# Patient Record
Sex: FEMALE | Race: WHITE | NOT HISPANIC OR LATINO | URBAN - METROPOLITAN AREA
[De-identification: names, ages, dates, MRNs, and addresses within clinical notes are randomized per-mention and may not be internally consistent; named-entity substitution may affect disease eponyms.]

---

## 2024-04-10 ENCOUNTER — LAB REQUISITION (OUTPATIENT)
Dept: LAB | Facility: HOSPITAL | Age: 34
End: 2024-04-10
Payer: COMMERCIAL

## 2024-04-10 ENCOUNTER — LAB (OUTPATIENT)
Dept: LAB | Facility: LAB | Age: 34
End: 2024-04-10

## 2024-04-10 DIAGNOSIS — Z34.90 ENCOUNTER FOR SUPERVISION OF NORMAL PREGNANCY, UNSPECIFIED, UNSPECIFIED TRIMESTER (HHS-HCC): ICD-10-CM

## 2024-04-10 DIAGNOSIS — Z34.90 ENCOUNTER FOR SUPERVISION OF NORMAL PREGNANCY, UNSPECIFIED, UNSPECIFIED TRIMESTER (HHS-HCC): Primary | ICD-10-CM

## 2024-04-10 LAB
ABO GROUP (TYPE) IN BLOOD: NORMAL
ANTIBODY SCREEN: NORMAL
ERYTHROCYTE [DISTWIDTH] IN BLOOD BY AUTOMATED COUNT: 13.7 % (ref 11.5–14.5)
HCT VFR BLD AUTO: 40 % (ref 36–46)
HGB BLD-MCNC: 13.3 G/DL (ref 12–16)
HIV 1+2 AB+HIV1 P24 AG SERPL QL IA: NONREACTIVE
MCH RBC QN AUTO: 29.4 PG (ref 26–34)
MCHC RBC AUTO-ENTMCNC: 33.3 G/DL (ref 32–36)
MCV RBC AUTO: 88 FL (ref 80–100)
NRBC BLD-RTO: 0 /100 WBCS (ref 0–0)
PLATELET # BLD AUTO: 207 X10*3/UL (ref 150–450)
RBC # BLD AUTO: 4.53 X10*6/UL (ref 4–5.2)
REFLEX ADDED, ANEMIA PANEL: NORMAL
RH FACTOR (ANTIGEN D): NORMAL
RUBV IGG SERPL IA-ACNC: 1 IA
RUBV IGG SERPL QL IA: POSITIVE
TREPONEMA PALLIDUM IGG+IGM AB [PRESENCE] IN SERUM OR PLASMA BY IMMUNOASSAY: NONREACTIVE
WBC # BLD AUTO: 6.8 X10*3/UL (ref 4.4–11.3)

## 2024-04-10 PROCEDURE — 86900 BLOOD TYPING SEROLOGIC ABO: CPT

## 2024-04-10 PROCEDURE — 86901 BLOOD TYPING SEROLOGIC RH(D): CPT

## 2024-04-10 PROCEDURE — 87800 DETECT AGNT MULT DNA DIREC: CPT

## 2024-04-10 PROCEDURE — 87340 HEPATITIS B SURFACE AG IA: CPT

## 2024-04-10 PROCEDURE — 86850 RBC ANTIBODY SCREEN: CPT

## 2024-04-10 PROCEDURE — 87389 HIV-1 AG W/HIV-1&-2 AB AG IA: CPT

## 2024-04-10 PROCEDURE — 86780 TREPONEMA PALLIDUM: CPT

## 2024-04-10 PROCEDURE — 36415 COLL VENOUS BLD VENIPUNCTURE: CPT

## 2024-04-10 PROCEDURE — 86803 HEPATITIS C AB TEST: CPT

## 2024-04-10 PROCEDURE — 85027 COMPLETE CBC AUTOMATED: CPT

## 2024-04-10 PROCEDURE — 86317 IMMUNOASSAY INFECTIOUS AGENT: CPT

## 2024-04-10 PROCEDURE — 87086 URINE CULTURE/COLONY COUNT: CPT

## 2024-04-11 LAB
BACTERIA UR CULT: NORMAL
C TRACH RRNA SPEC QL NAA+PROBE: NEGATIVE
HBV SURFACE AG SERPL QL IA: NONREACTIVE
HCV AB SER QL: NONREACTIVE
N GONORRHOEA DNA SPEC QL PROBE+SIG AMP: NEGATIVE

## 2024-06-19 ENCOUNTER — LAB (OUTPATIENT)
Dept: LAB | Facility: LAB | Age: 34
End: 2024-06-19
Payer: COMMERCIAL

## 2024-06-19 DIAGNOSIS — Z34.90 ENCOUNTER FOR SUPERVISION OF NORMAL PREGNANCY, UNSPECIFIED, UNSPECIFIED TRIMESTER (HHS-HCC): Primary | ICD-10-CM

## 2024-06-19 LAB
ABO GROUP (TYPE) IN BLOOD: NORMAL
ANTIBODY SCREEN: NORMAL
ERYTHROCYTE [DISTWIDTH] IN BLOOD BY AUTOMATED COUNT: 13.2 % (ref 11.5–14.5)
GLUCOSE 1H P 50 G GLC PO SERPL-MCNC: 131 MG/DL
HCT VFR BLD AUTO: 33.5 % (ref 36–46)
HGB BLD-MCNC: 11 G/DL (ref 12–16)
MCH RBC QN AUTO: 28.9 PG (ref 26–34)
MCHC RBC AUTO-ENTMCNC: 32.8 G/DL (ref 32–36)
MCV RBC AUTO: 88 FL (ref 80–100)
NRBC BLD-RTO: 0 /100 WBCS (ref 0–0)
PLATELET # BLD AUTO: 188 X10*3/UL (ref 150–450)
RBC # BLD AUTO: 3.8 X10*6/UL (ref 4–5.2)
REFLEX ADDED, ANEMIA PANEL: NORMAL
RH FACTOR (ANTIGEN D): NORMAL
WBC # BLD AUTO: 6.2 X10*3/UL (ref 4.4–11.3)

## 2024-06-19 PROCEDURE — 86850 RBC ANTIBODY SCREEN: CPT

## 2024-06-19 PROCEDURE — 82947 ASSAY GLUCOSE BLOOD QUANT: CPT

## 2024-06-19 PROCEDURE — 85027 COMPLETE CBC AUTOMATED: CPT

## 2024-06-19 PROCEDURE — 86900 BLOOD TYPING SEROLOGIC ABO: CPT

## 2024-06-19 PROCEDURE — 86901 BLOOD TYPING SEROLOGIC RH(D): CPT

## 2024-06-19 PROCEDURE — 36415 COLL VENOUS BLD VENIPUNCTURE: CPT

## 2024-08-15 ENCOUNTER — LAB REQUISITION (OUTPATIENT)
Dept: LAB | Facility: HOSPITAL | Age: 34
End: 2024-08-15
Payer: COMMERCIAL

## 2024-08-15 DIAGNOSIS — Z34.90 ENCOUNTER FOR SUPERVISION OF NORMAL PREGNANCY, UNSPECIFIED, UNSPECIFIED TRIMESTER (HHS-HCC): ICD-10-CM

## 2024-08-15 PROCEDURE — 87081 CULTURE SCREEN ONLY: CPT

## 2024-08-18 LAB — GP B STREP GENITAL QL CULT: NORMAL

## 2024-09-07 ENCOUNTER — HOSPITAL ENCOUNTER (OUTPATIENT)
Facility: HOSPITAL | Age: 34
Discharge: HOME | End: 2024-09-07
Attending: OBSTETRICS & GYNECOLOGY | Admitting: OBSTETRICS & GYNECOLOGY
Payer: COMMERCIAL

## 2024-09-07 VITALS
BODY MASS INDEX: 26.99 KG/M2 | DIASTOLIC BLOOD PRESSURE: 76 MMHG | WEIGHT: 152.34 LBS | TEMPERATURE: 98.1 F | HEART RATE: 103 BPM | HEIGHT: 63 IN | OXYGEN SATURATION: 99 % | SYSTOLIC BLOOD PRESSURE: 118 MMHG | RESPIRATION RATE: 18 BRPM

## 2024-09-07 PROCEDURE — 99215 OFFICE O/P EST HI 40 MIN: CPT | Mod: 25

## 2024-09-07 PROCEDURE — 59025 FETAL NON-STRESS TEST: CPT | Performed by: OBSTETRICS & GYNECOLOGY

## 2024-09-07 PROCEDURE — 99212 OFFICE O/P EST SF 10 MIN: CPT | Performed by: OBSTETRICS & GYNECOLOGY

## 2024-09-07 RX ORDER — SERTRALINE HYDROCHLORIDE 50 MG/1
50 TABLET, FILM COATED ORAL DAILY
COMMUNITY

## 2024-09-07 ASSESSMENT — PAIN SCALES - GENERAL: PAINLEVEL_OUTOF10: 3

## 2024-09-07 NOTE — DISCHARGE SUMMARY
Discharge Summary    Admission Date: 9/7/2024  Discharge Date: 9/7    Discharge Diagnosis  False   labor   , decr   fetal activity     Hospital Course  Delivery Date: This patient has no babies on file. This patient has no babies on file.  Delivery type: This patient has no babies on file.   GA at delivery: 39w3d  Outcome: This patient has no babies on file.  Anesthesia during delivery: This patient has no babies on file.  Intrapartum complications: This patient has no babies on file.  Feeding method:       Procedures:  nst  Contraception at discharge: none      Pertinent Physical Exam At Time of Discharge    Cx   1-2/long    Last Vitals:  Temp Pulse Resp BP MAP Pulse Ox   36.7 °C (98.1 °F) 103 18 118/76 92 99 %     Discharge Meds     Your medication list        CONTINUE taking these medications        Instructions Last Dose Given Next Dose Due   PRENATAL VITAMIN ORAL           sertraline 50 mg tablet  Commonly known as: Zoloft                     Complications Requiring Follow-Up  Iol tues  rv  if   reg   ctxs     or    rom       Test Results Pending At Discharge  Pending Labs       No current pending labs.            Outpatient Follow-Up  Future Appointments   Date Time Provider Department Center   9/10/2024  6:00 AM [facility] L&D PROCEDURE ROOM 1 [address] spent 15 minutes in the professional and overall care of this patient.      Javon Galvan MD

## 2024-09-07 NOTE — H&P
Note Type:  OB Triage H&P    ASSESSMENT & PLAN: Charlee Villegas is a 34 y.o.  at 39w3d. MARGOT: 2024, by Ultrasound. Decr  FA   and   ?  Ctxs  aller    zithromax   meds   zoloft  pmh   depression , anxiety  psh  neg smoke  etoh  drugs   neg    gbs   neg      Diagnosis: false labor  , reactive   nst      Plan  -Admit to L&D, consented  - corticosteroids: not indicated  -Magnesium for fetal neuroprotection: not indicated  -GBS prophylaxis: not indicated    Fetal Status  -NST reactive, reassuring     -EFW 3600 gms by exam      -GBS neg  -NICU consult no    -Postpartum Contraception Plan:     Pregnancy Problems (from 24 to present)       No problems associated with this episode.            Subjective   Decreased fetal movement.      Prenatal Provider andr    OB History    Para Term  AB Living   8 5 5 0 2 5   SAB IAB Ectopic Multiple Live Births   2 0 0 0 5      # Outcome Date GA Lbr Marlon/2nd Weight Sex Type Anes PTL Lv   8 Current            7 Term 22    F Vag-Spont   RASHAUN   6 Term 20 39w0d   M Vag-Spont None  RASHAUN   5 2019     Complete      4 Term 18 38w0d   M Vag-Spont None  RASHAUN   3 SAB  7w0d    Complete      2 Term 09/15/15 38w0d   M Vag-Spont None  RASHAUN   1 Term 14 38w0d   M Vag-Spont EPI  RASHAUN       No past surgical history on file.    Social History     Tobacco Use    Smoking status: Never    Smokeless tobacco: Never   Substance Use Topics    Alcohol use: Never       Allergies   Allergen Reactions    Azithromycin Diarrhea       Medications Prior to Admission   Medication Sig Dispense Refill Last Dose    prenatal vit no.124/iron/folic (PRENATAL VITAMIN ORAL) Take 1 tablet by mouth once daily.   2024    sertraline (Zoloft) 50 mg tablet Take 1 tablet (50 mg) by mouth once daily.   Past Week     Objective     Last Vitals  Temp Pulse Resp BP MAP O2 Sat   36.7 °C (98.1 °F) 103 18 118/76 92 99 %     Blood Pressures         2024  1440              BP: 118/76             Physical Exam  General: NAD, mood appropriate  Cardiopulmonary: warm and well perfused, breathing comfortably on room air  Abdomen: Gravid, non-tender  Extremities: full range of motion   Cervix: 2 /long       Fetal Monitoring  Baseline: nl bpm, Variability: moderate,  Accelerations: present and Decelerations: none    Uterine Activity: No contractions seen on toco    Interpretation: Reactive    Bedside ultrasound: No

## 2024-09-10 ENCOUNTER — HOSPITAL ENCOUNTER (INPATIENT)
Facility: HOSPITAL | Age: 34
LOS: 1 days | Discharge: HOME | End: 2024-09-11
Attending: OBSTETRICS & GYNECOLOGY | Admitting: OBSTETRICS & GYNECOLOGY
Payer: COMMERCIAL

## 2024-09-10 ENCOUNTER — APPOINTMENT (OUTPATIENT)
Dept: OBSTETRICS AND GYNECOLOGY | Facility: HOSPITAL | Age: 34
End: 2024-09-10
Payer: COMMERCIAL

## 2024-09-10 PROBLEM — I42.9 FAMILIAL CARDIOMYOPATHY (MULTI): Status: ACTIVE | Noted: 2024-09-10

## 2024-09-10 PROBLEM — L23.7 CONTACT DERMATITIS DUE TO POISON IVY: Status: RESOLVED | Noted: 2024-09-10 | Resolved: 2024-09-10

## 2024-09-10 PROBLEM — Z34.90 TERM PREGNANCY (HHS-HCC): Status: RESOLVED | Noted: 2024-09-10 | Resolved: 2024-09-10

## 2024-09-10 PROBLEM — Z34.90 TERM PREGNANCY (HHS-HCC): Status: ACTIVE | Noted: 2024-09-10

## 2024-09-10 LAB
ABO GROUP (TYPE) IN BLOOD: NORMAL
ANTIBODY SCREEN: NORMAL
BB ANTIBODY IDENTIFICATION: NORMAL
CASE #: NORMAL
ERYTHROCYTE [DISTWIDTH] IN BLOOD BY AUTOMATED COUNT: 15.6 % (ref 11.5–14.5)
HCT VFR BLD AUTO: 36.2 % (ref 36–46)
HGB BLD-MCNC: 12 G/DL (ref 12–16)
MCH RBC QN AUTO: 28.2 PG (ref 26–34)
MCHC RBC AUTO-ENTMCNC: 33.1 G/DL (ref 32–36)
MCV RBC AUTO: 85 FL (ref 80–100)
NRBC BLD-RTO: 0 /100 WBCS (ref 0–0)
PLATELET # BLD AUTO: 157 X10*3/UL (ref 150–450)
RBC # BLD AUTO: 4.26 X10*6/UL (ref 4–5.2)
RH FACTOR (ANTIGEN D): NORMAL
TREPONEMA PALLIDUM IGG+IGM AB [PRESENCE] IN SERUM OR PLASMA BY IMMUNOASSAY: NONREACTIVE
WBC # BLD AUTO: 6.4 X10*3/UL (ref 4.4–11.3)

## 2024-09-10 PROCEDURE — 59409 OBSTETRICAL CARE: CPT | Performed by: OBSTETRICS & GYNECOLOGY

## 2024-09-10 PROCEDURE — 86780 TREPONEMA PALLIDUM: CPT | Mod: GEALAB | Performed by: OBSTETRICS & GYNECOLOGY

## 2024-09-10 PROCEDURE — 86870 RBC ANTIBODY IDENTIFICATION: CPT

## 2024-09-10 PROCEDURE — 7210000002 HC LABOR PER HOUR

## 2024-09-10 PROCEDURE — 85027 COMPLETE CBC AUTOMATED: CPT | Performed by: OBSTETRICS & GYNECOLOGY

## 2024-09-10 PROCEDURE — 10907ZC DRAINAGE OF AMNIOTIC FLUID, THERAPEUTIC FROM PRODUCTS OF CONCEPTION, VIA NATURAL OR ARTIFICIAL OPENING: ICD-10-PCS | Performed by: OBSTETRICS & GYNECOLOGY

## 2024-09-10 PROCEDURE — 59050 FETAL MONITOR W/REPORT: CPT

## 2024-09-10 PROCEDURE — 36415 COLL VENOUS BLD VENIPUNCTURE: CPT | Performed by: OBSTETRICS & GYNECOLOGY

## 2024-09-10 PROCEDURE — 3E0P7VZ INTRODUCTION OF HORMONE INTO FEMALE REPRODUCTIVE, VIA NATURAL OR ARTIFICIAL OPENING: ICD-10-PCS | Performed by: OBSTETRICS & GYNECOLOGY

## 2024-09-10 PROCEDURE — 3E033VJ INTRODUCTION OF OTHER HORMONE INTO PERIPHERAL VEIN, PERCUTANEOUS APPROACH: ICD-10-PCS | Performed by: OBSTETRICS & GYNECOLOGY

## 2024-09-10 PROCEDURE — 7100000016 HC LABOR RECOVERY PER HOUR

## 2024-09-10 PROCEDURE — 86901 BLOOD TYPING SEROLOGIC RH(D): CPT | Performed by: OBSTETRICS & GYNECOLOGY

## 2024-09-10 PROCEDURE — 2500000001 HC RX 250 WO HCPCS SELF ADMINISTERED DRUGS (ALT 637 FOR MEDICARE OP): Performed by: OBSTETRICS & GYNECOLOGY

## 2024-09-10 PROCEDURE — 2500000004 HC RX 250 GENERAL PHARMACY W/ HCPCS (ALT 636 FOR OP/ED): Performed by: OBSTETRICS & GYNECOLOGY

## 2024-09-10 PROCEDURE — 1120000001 HC OB PRIVATE ROOM DAILY

## 2024-09-10 RX ORDER — LOPERAMIDE HYDROCHLORIDE 2 MG/1
4 CAPSULE ORAL EVERY 2 HOUR PRN
Status: DISCONTINUED | OUTPATIENT
Start: 2024-09-10 | End: 2024-09-11 | Stop reason: HOSPADM

## 2024-09-10 RX ORDER — OXYTOCIN/0.9 % SODIUM CHLORIDE 30/500 ML
2-30 PLASTIC BAG, INJECTION (ML) INTRAVENOUS CONTINUOUS
Status: DISCONTINUED | OUTPATIENT
Start: 2024-09-10 | End: 2024-09-10

## 2024-09-10 RX ORDER — CARBOPROST TROMETHAMINE 250 UG/ML
250 INJECTION, SOLUTION INTRAMUSCULAR ONCE AS NEEDED
Status: DISCONTINUED | OUTPATIENT
Start: 2024-09-10 | End: 2024-09-11 | Stop reason: HOSPADM

## 2024-09-10 RX ORDER — NIFEDIPINE 10 MG/1
10 CAPSULE ORAL ONCE AS NEEDED
Status: DISCONTINUED | OUTPATIENT
Start: 2024-09-10 | End: 2024-09-10

## 2024-09-10 RX ORDER — ONDANSETRON 4 MG/1
4 TABLET, FILM COATED ORAL EVERY 6 HOURS PRN
Status: DISCONTINUED | OUTPATIENT
Start: 2024-09-10 | End: 2024-09-11 | Stop reason: HOSPADM

## 2024-09-10 RX ORDER — HYDRALAZINE HYDROCHLORIDE 20 MG/ML
5 INJECTION INTRAMUSCULAR; INTRAVENOUS ONCE AS NEEDED
Status: DISCONTINUED | OUTPATIENT
Start: 2024-09-10 | End: 2024-09-11 | Stop reason: HOSPADM

## 2024-09-10 RX ORDER — HYDRALAZINE HYDROCHLORIDE 20 MG/ML
5 INJECTION INTRAMUSCULAR; INTRAVENOUS ONCE AS NEEDED
Status: DISCONTINUED | OUTPATIENT
Start: 2024-09-10 | End: 2024-09-10

## 2024-09-10 RX ORDER — METOCLOPRAMIDE 10 MG/1
10 TABLET ORAL EVERY 6 HOURS PRN
Status: DISCONTINUED | OUTPATIENT
Start: 2024-09-10 | End: 2024-09-10

## 2024-09-10 RX ORDER — MISOPROSTOL 200 UG/1
800 TABLET ORAL ONCE AS NEEDED
Status: DISCONTINUED | OUTPATIENT
Start: 2024-09-10 | End: 2024-09-11 | Stop reason: HOSPADM

## 2024-09-10 RX ORDER — TRANEXAMIC ACID 100 MG/ML
1000 INJECTION, SOLUTION INTRAVENOUS ONCE AS NEEDED
Status: DISCONTINUED | OUTPATIENT
Start: 2024-09-10 | End: 2024-09-11 | Stop reason: HOSPADM

## 2024-09-10 RX ORDER — OXYTOCIN 10 [USP'U]/ML
10 INJECTION, SOLUTION INTRAMUSCULAR; INTRAVENOUS ONCE AS NEEDED
Status: DISCONTINUED | OUTPATIENT
Start: 2024-09-10 | End: 2024-09-10

## 2024-09-10 RX ORDER — METHYLERGONOVINE MALEATE 0.2 MG/ML
0.2 INJECTION INTRAVENOUS ONCE AS NEEDED
Status: DISCONTINUED | OUTPATIENT
Start: 2024-09-10 | End: 2024-09-10

## 2024-09-10 RX ORDER — METHYLERGONOVINE MALEATE 0.2 MG/ML
0.2 INJECTION INTRAVENOUS ONCE AS NEEDED
Status: DISCONTINUED | OUTPATIENT
Start: 2024-09-10 | End: 2024-09-11 | Stop reason: HOSPADM

## 2024-09-10 RX ORDER — CARBOPROST TROMETHAMINE 250 UG/ML
250 INJECTION, SOLUTION INTRAMUSCULAR ONCE AS NEEDED
Status: DISCONTINUED | OUTPATIENT
Start: 2024-09-10 | End: 2024-09-10

## 2024-09-10 RX ORDER — LOPERAMIDE HYDROCHLORIDE 2 MG/1
4 CAPSULE ORAL EVERY 2 HOUR PRN
Status: DISCONTINUED | OUTPATIENT
Start: 2024-09-10 | End: 2024-09-10

## 2024-09-10 RX ORDER — LIDOCAINE HYDROCHLORIDE 10 MG/ML
30 INJECTION, SOLUTION INFILTRATION; PERINEURAL ONCE AS NEEDED
Status: DISCONTINUED | OUTPATIENT
Start: 2024-09-10 | End: 2024-09-10

## 2024-09-10 RX ORDER — ONDANSETRON HYDROCHLORIDE 2 MG/ML
4 INJECTION, SOLUTION INTRAVENOUS EVERY 6 HOURS PRN
Status: DISCONTINUED | OUTPATIENT
Start: 2024-09-10 | End: 2024-09-11 | Stop reason: HOSPADM

## 2024-09-10 RX ORDER — OXYTOCIN 10 [USP'U]/ML
10 INJECTION, SOLUTION INTRAMUSCULAR; INTRAVENOUS ONCE AS NEEDED
Status: DISCONTINUED | OUTPATIENT
Start: 2024-09-10 | End: 2024-09-11 | Stop reason: HOSPADM

## 2024-09-10 RX ORDER — OXYTOCIN/0.9 % SODIUM CHLORIDE 30/500 ML
60 PLASTIC BAG, INJECTION (ML) INTRAVENOUS ONCE AS NEEDED
Status: DISCONTINUED | OUTPATIENT
Start: 2024-09-10 | End: 2024-09-11 | Stop reason: HOSPADM

## 2024-09-10 RX ORDER — SODIUM CHLORIDE, SODIUM LACTATE, POTASSIUM CHLORIDE, CALCIUM CHLORIDE 600; 310; 30; 20 MG/100ML; MG/100ML; MG/100ML; MG/100ML
125 INJECTION, SOLUTION INTRAVENOUS CONTINUOUS
Status: DISCONTINUED | OUTPATIENT
Start: 2024-09-10 | End: 2024-09-10

## 2024-09-10 RX ORDER — ADHESIVE BANDAGE
10 BANDAGE TOPICAL
Status: DISCONTINUED | OUTPATIENT
Start: 2024-09-10 | End: 2024-09-11 | Stop reason: HOSPADM

## 2024-09-10 RX ORDER — NIFEDIPINE 10 MG/1
10 CAPSULE ORAL ONCE AS NEEDED
Status: DISCONTINUED | OUTPATIENT
Start: 2024-09-10 | End: 2024-09-11 | Stop reason: HOSPADM

## 2024-09-10 RX ORDER — BISACODYL 10 MG/1
10 SUPPOSITORY RECTAL DAILY PRN
Status: DISCONTINUED | OUTPATIENT
Start: 2024-09-10 | End: 2024-09-11 | Stop reason: HOSPADM

## 2024-09-10 RX ORDER — ONDANSETRON HYDROCHLORIDE 2 MG/ML
4 INJECTION, SOLUTION INTRAVENOUS EVERY 6 HOURS PRN
Status: DISCONTINUED | OUTPATIENT
Start: 2024-09-10 | End: 2024-09-10

## 2024-09-10 RX ORDER — LABETALOL HYDROCHLORIDE 5 MG/ML
20 INJECTION, SOLUTION INTRAVENOUS ONCE AS NEEDED
Status: DISCONTINUED | OUTPATIENT
Start: 2024-09-10 | End: 2024-09-11 | Stop reason: HOSPADM

## 2024-09-10 RX ORDER — DIPHENHYDRAMINE HCL 25 MG
25 CAPSULE ORAL EVERY 6 HOURS PRN
Status: DISCONTINUED | OUTPATIENT
Start: 2024-09-10 | End: 2024-09-11 | Stop reason: HOSPADM

## 2024-09-10 RX ORDER — DIPHENHYDRAMINE HYDROCHLORIDE 50 MG/ML
25 INJECTION INTRAMUSCULAR; INTRAVENOUS EVERY 6 HOURS PRN
Status: DISCONTINUED | OUTPATIENT
Start: 2024-09-10 | End: 2024-09-11 | Stop reason: HOSPADM

## 2024-09-10 RX ORDER — TRANEXAMIC ACID 100 MG/ML
1000 INJECTION, SOLUTION INTRAVENOUS ONCE AS NEEDED
Status: DISCONTINUED | OUTPATIENT
Start: 2024-09-10 | End: 2024-09-10

## 2024-09-10 RX ORDER — ACETAMINOPHEN 325 MG/1
975 TABLET ORAL EVERY 6 HOURS
Status: DISCONTINUED | OUTPATIENT
Start: 2024-09-10 | End: 2024-09-11 | Stop reason: HOSPADM

## 2024-09-10 RX ORDER — LABETALOL HYDROCHLORIDE 5 MG/ML
20 INJECTION, SOLUTION INTRAVENOUS ONCE AS NEEDED
Status: DISCONTINUED | OUTPATIENT
Start: 2024-09-10 | End: 2024-09-10

## 2024-09-10 RX ORDER — MISOPROSTOL 200 UG/1
800 TABLET ORAL ONCE AS NEEDED
Status: DISCONTINUED | OUTPATIENT
Start: 2024-09-10 | End: 2024-09-10

## 2024-09-10 RX ORDER — METOCLOPRAMIDE HYDROCHLORIDE 5 MG/ML
10 INJECTION INTRAMUSCULAR; INTRAVENOUS EVERY 6 HOURS PRN
Status: DISCONTINUED | OUTPATIENT
Start: 2024-09-10 | End: 2024-09-10

## 2024-09-10 RX ORDER — NALBUPHINE HYDROCHLORIDE 10 MG/ML
10 INJECTION, SOLUTION INTRAMUSCULAR; INTRAVENOUS; SUBCUTANEOUS
Status: DISCONTINUED | OUTPATIENT
Start: 2024-09-10 | End: 2024-09-10

## 2024-09-10 RX ORDER — OXYTOCIN/0.9 % SODIUM CHLORIDE 30/500 ML
60 PLASTIC BAG, INJECTION (ML) INTRAVENOUS ONCE AS NEEDED
Status: DISCONTINUED | OUTPATIENT
Start: 2024-09-10 | End: 2024-09-10

## 2024-09-10 RX ORDER — SIMETHICONE 80 MG
80 TABLET,CHEWABLE ORAL 4 TIMES DAILY PRN
Status: DISCONTINUED | OUTPATIENT
Start: 2024-09-10 | End: 2024-09-11 | Stop reason: HOSPADM

## 2024-09-10 RX ORDER — POLYETHYLENE GLYCOL 3350 17 G/17G
17 POWDER, FOR SOLUTION ORAL 2 TIMES DAILY PRN
Status: DISCONTINUED | OUTPATIENT
Start: 2024-09-10 | End: 2024-09-11 | Stop reason: HOSPADM

## 2024-09-10 RX ORDER — LIDOCAINE 560 MG/1
1 PATCH PERCUTANEOUS; TOPICAL; TRANSDERMAL
Status: DISCONTINUED | OUTPATIENT
Start: 2024-09-10 | End: 2024-09-11 | Stop reason: HOSPADM

## 2024-09-10 RX ORDER — ENOXAPARIN SODIUM 100 MG/ML
40 INJECTION SUBCUTANEOUS EVERY 24 HOURS
Status: DISCONTINUED | OUTPATIENT
Start: 2024-09-11 | End: 2024-09-11 | Stop reason: HOSPADM

## 2024-09-10 RX ORDER — IBUPROFEN 600 MG/1
600 TABLET ORAL EVERY 6 HOURS
Status: DISCONTINUED | OUTPATIENT
Start: 2024-09-10 | End: 2024-09-11 | Stop reason: HOSPADM

## 2024-09-10 RX ORDER — ONDANSETRON 4 MG/1
4 TABLET, FILM COATED ORAL EVERY 6 HOURS PRN
Status: DISCONTINUED | OUTPATIENT
Start: 2024-09-10 | End: 2024-09-10

## 2024-09-10 RX ORDER — TERBUTALINE SULFATE 1 MG/ML
0.25 INJECTION SUBCUTANEOUS ONCE AS NEEDED
Status: DISCONTINUED | OUTPATIENT
Start: 2024-09-10 | End: 2024-09-10

## 2024-09-10 SDOH — SOCIAL STABILITY: SOCIAL INSECURITY: DO YOU FEEL ANYONE HAS EXPLOITED OR TAKEN ADVANTAGE OF YOU FINANCIALLY OR OF YOUR PERSONAL PROPERTY?: NO

## 2024-09-10 SDOH — SOCIAL STABILITY: SOCIAL INSECURITY: HAVE YOU HAD ANY THOUGHTS OF HARMING ANYONE ELSE?: NO

## 2024-09-10 SDOH — ECONOMIC STABILITY: HOUSING INSECURITY: DO YOU FEEL UNSAFE GOING BACK TO THE PLACE WHERE YOU ARE LIVING?: NO

## 2024-09-10 SDOH — SOCIAL STABILITY: SOCIAL INSECURITY: ABUSE SCREEN: ADULT

## 2024-09-10 SDOH — SOCIAL STABILITY: SOCIAL INSECURITY: VERBAL ABUSE: DENIES

## 2024-09-10 SDOH — SOCIAL STABILITY: SOCIAL INSECURITY: ARE THERE ANY APPARENT SIGNS OF INJURIES/BEHAVIORS THAT COULD BE RELATED TO ABUSE/NEGLECT?: NO

## 2024-09-10 SDOH — SOCIAL STABILITY: SOCIAL INSECURITY: DOES ANYONE TRY TO KEEP YOU FROM HAVING/CONTACTING OTHER FRIENDS OR DOING THINGS OUTSIDE YOUR HOME?: NO

## 2024-09-10 SDOH — ECONOMIC STABILITY: INCOME INSECURITY: HOW HARD IS IT FOR YOU TO PAY FOR THE VERY BASICS LIKE FOOD, HOUSING, MEDICAL CARE, AND HEATING?: NOT VERY HARD

## 2024-09-10 SDOH — SOCIAL STABILITY: SOCIAL INSECURITY: HAS ANYONE EVER THREATENED TO HURT YOUR FAMILY OR YOUR PETS?: NO

## 2024-09-10 SDOH — SOCIAL STABILITY: SOCIAL INSECURITY: ARE YOU OR HAVE YOU BEEN THREATENED OR ABUSED PHYSICALLY, EMOTIONALLY, OR SEXUALLY BY ANYONE?: NO

## 2024-09-10 SDOH — SOCIAL STABILITY: SOCIAL INSECURITY: HAVE YOU HAD THOUGHTS OF HARMING ANYONE ELSE?: NO

## 2024-09-10 SDOH — SOCIAL STABILITY: SOCIAL INSECURITY: PHYSICAL ABUSE: DENIES

## 2024-09-10 SDOH — ECONOMIC STABILITY: TRANSPORTATION INSECURITY
IN THE PAST 12 MONTHS, HAS LACK OF TRANSPORTATION KEPT YOU FROM MEETINGS, WORK, OR FROM GETTING THINGS NEEDED FOR DAILY LIVING?: NO

## 2024-09-10 SDOH — HEALTH STABILITY: MENTAL HEALTH: NON-SPECIFIC ACTIVE SUICIDAL THOUGHTS (PAST 1 MONTH): NO

## 2024-09-10 SDOH — ECONOMIC STABILITY: TRANSPORTATION INSECURITY
IN THE PAST 12 MONTHS, HAS THE LACK OF TRANSPORTATION KEPT YOU FROM MEDICAL APPOINTMENTS OR FROM GETTING MEDICATIONS?: NO

## 2024-09-10 SDOH — HEALTH STABILITY: MENTAL HEALTH: SUICIDAL BEHAVIOR (LIFETIME): NO

## 2024-09-10 SDOH — HEALTH STABILITY: MENTAL HEALTH: WERE YOU ABLE TO COMPLETE ALL THE BEHAVIORAL HEALTH SCREENINGS?: YES

## 2024-09-10 SDOH — HEALTH STABILITY: MENTAL HEALTH: WISH TO BE DEAD (PAST 1 MONTH): NO

## 2024-09-10 ASSESSMENT — PAIN SCALES - GENERAL
PAINLEVEL_OUTOF10: 3
PAINLEVEL_OUTOF10: 0 - NO PAIN
PAINLEVEL_OUTOF10: 0 - NO PAIN
PAINLEVEL_OUTOF10: 8
PAINLEVEL_OUTOF10: 8
PAINLEVEL_OUTOF10: 4
PAINLEVEL_OUTOF10: 1
PAINLEVEL_OUTOF10: 3
PAINLEVEL_OUTOF10: 0 - NO PAIN
PAINLEVEL_OUTOF10: 1
PAINLEVEL_OUTOF10: 3
PAINLEVEL_OUTOF10: 0 - NO PAIN
PAINLEVEL_OUTOF10: 4
PAINLEVEL_OUTOF10: 0 - NO PAIN
PAINLEVEL_OUTOF10: 0 - NO PAIN
PAINLEVEL_OUTOF10: 1
PAINLEVEL_OUTOF10: 0 - NO PAIN
PAINLEVEL_OUTOF10: 0 - NO PAIN
PAINLEVEL_OUTOF10: 1
PAINLEVEL_OUTOF10: 0 - NO PAIN
PAINLEVEL_OUTOF10: 5 - MODERATE PAIN
PAINLEVEL_OUTOF10: 0 - NO PAIN
PAINLEVEL_OUTOF10: 0 - NO PAIN

## 2024-09-10 ASSESSMENT — ACTIVITIES OF DAILY LIVING (ADL)
JUDGMENT_ADEQUATE_SAFELY_COMPLETE_DAILY_ACTIVITIES: YES
PATIENT'S MEMORY ADEQUATE TO SAFELY COMPLETE DAILY ACTIVITIES?: YES
HEARING - RIGHT EAR: FUNCTIONAL
HEARING - LEFT EAR: FUNCTIONAL
DRESSING YOURSELF: INDEPENDENT
TOILETING: INDEPENDENT
FEEDING YOURSELF: INDEPENDENT
BATHING: INDEPENDENT
ADEQUATE_TO_COMPLETE_ADL: YES
GROOMING: INDEPENDENT
WALKS IN HOME: INDEPENDENT

## 2024-09-10 ASSESSMENT — LIFESTYLE VARIABLES
HOW OFTEN DO YOU HAVE 6 OR MORE DRINKS ON ONE OCCASION: NEVER
HOW MANY STANDARD DRINKS CONTAINING ALCOHOL DO YOU HAVE ON A TYPICAL DAY: PATIENT DOES NOT DRINK
AUDIT-C TOTAL SCORE: 0
SKIP TO QUESTIONS 9-10: 1
HOW OFTEN DO YOU HAVE A DRINK CONTAINING ALCOHOL: NEVER
SKIP TO QUESTIONS 9-10: 1
HOW OFTEN DO YOU HAVE A DRINK CONTAINING ALCOHOL: NEVER
AUDIT-C TOTAL SCORE: 0
HOW OFTEN DO YOU HAVE 6 OR MORE DRINKS ON ONE OCCASION: NEVER
AUDIT-C TOTAL SCORE: 0
AUDIT-C TOTAL SCORE: 0
HOW MANY STANDARD DRINKS CONTAINING ALCOHOL DO YOU HAVE ON A TYPICAL DAY: PATIENT DOES NOT DRINK

## 2024-09-10 ASSESSMENT — PAIN DESCRIPTION - LOCATION
LOCATION: ABDOMEN
LOCATION: ABDOMEN

## 2024-09-10 ASSESSMENT — PATIENT HEALTH QUESTIONNAIRE - PHQ9
1. LITTLE INTEREST OR PLEASURE IN DOING THINGS: NOT AT ALL
2. FEELING DOWN, DEPRESSED OR HOPELESS: NOT AT ALL
2. FEELING DOWN, DEPRESSED OR HOPELESS: NOT AT ALL
SUM OF ALL RESPONSES TO PHQ9 QUESTIONS 1 & 2: 0

## 2024-09-10 ASSESSMENT — PAIN SCALES - WONG BAKER: WONGBAKER_NUMERICALRESPONSE: HURTS LITTLE BIT

## 2024-09-10 NOTE — CARE PLAN
Problem: Pain - Adult  Goal: Verbalizes/displays adequate comfort level or baseline comfort level  Outcome: Progressing     Problem: Safety - Adult  Goal: Free from fall injury  Outcome: Progressing     Problem: Discharge Planning  Goal: Discharge to home or other facility with appropriate resources  Outcome: Progressing   The patient's goals for the shift include safe labor and delivery       The clinical goals for the shift include safe labor and delivery    Over the shift, the patient did not make progress toward the following goals. Barriers to progression include none. Recommendations to address these barriers include none.

## 2024-09-10 NOTE — H&P
Note Type:  OB Triage H&P    ASSESSMENT & PLAN: Charlee Villegas is a 34 y.o.  at 39w3d. MARGOT: 2024, by Ultrasound. Decr  FA   and   ?  Ctxs  aller    zithromax   meds   zoloft  pmh   depression , anxiety  psh  neg smoke  etoh  drugs   neg    gbs   neg      Diagnosis: false labor  , reactive   nst      Plan  -Admit to L&D, consented  - corticosteroids: not indicated  -Magnesium for fetal neuroprotection: not indicated  -GBS prophylaxis: not indicated    Fetal Status  -NST reactive, reassuring     -EFW 3600 gms by exam      -GBS neg  -NICU consult no    -Postpartum Contraception Plan:     Pregnancy Problems (from 24 to present)       No problems associated with this episode.            Subjective   Decreased fetal movement.      Prenatal Provider andr    OB History    Para Term  AB Living   8 5 5 0 2 5   SAB IAB Ectopic Multiple Live Births   2 0 0 0 5      # Outcome Date GA Lbr Marlon/2nd Weight Sex Type Anes PTL Lv   8 Current            7 Term 22 39w0d   F Vag-Spont   RASHAUN   6 Term 20 39w0d   M Vag-Spont None  RASHAUN   5 SAB      Complete      4 Term 18 38w0d   M Vag-Spont None  RASHAUN   3 SAB  7w0d    Complete      2 Term 09/15/15 38w0d   M Vag-Spont None  RASHAUN   1 Term 14 38w0d   M Vag-Spont EPI  RASHAUN       No past surgical history on file.    Social History     Tobacco Use    Smoking status: Never    Smokeless tobacco: Never   Substance Use Topics    Alcohol use: Never       Allergies   Allergen Reactions    Azithromycin Diarrhea       Medications Prior to Admission   Medication Sig Dispense Refill Last Dose    prenatal vit no.124/iron/folic (PRENATAL VITAMIN ORAL) Take 1 tablet by mouth once daily.       sertraline (Zoloft) 50 mg tablet Take 1 tablet (50 mg) by mouth once daily.   24     Objective     Last Vitals  Temp Pulse Resp BP MAP O2 Sat   36.6 °C (97.9 °F) 100 16 105/70 83 99 %     Blood Pressures         9/10/2024  0610             BP:  105/70             Physical Exam  General: NAD, mood appropriate  Cardiopulmonary: warm and well perfused, breathing comfortably on room air  Abdomen: Gravid, non-tender  Extremities: full range of motion   Cervix:   /long       Fetal Monitoring  Baseline: nl bpm, Variability: moderate,  Accelerations: present and Decelerations: none    Uterine Activity: No contractions seen on toco    Interpretation: Reactive    Bedside ultrasound: No      Results from last 7 days   Lab Units 09/10/24  0636   WBC AUTO x10*3/uL 6.4   HEMOGLOBIN g/dL 12.0   HEMATOCRIT % 36.2   PLATELETS AUTO x10*3/uL 157

## 2024-09-10 NOTE — LACTATION NOTE
This note was copied from a baby's chart.  Lactation Consultant Note  Lactation Consultation  Reason for Consult: Initial assessment  Consultant Name: Miguel Heller    Maternal Information  Has mother  before?: Yes  How long did the mother previously breastfeed?: 7 months x1 and 14 months with her other 4  Infant to breast within first 2 hours of birth?: Yes  Exclusive Pump and Bottle Feed: No    Maternal Assessment  Breast Assessment: Breast changes observed in pregnancy  Nipple Assessment: Intact (per mother)  Alterations in Nipple Condition:  (mother denies pain)  Areola Assessment:  (did not assess)    Infant Assessment  Infant Behavior:  (infant at the warmer getting weighed)  Infant Assessment:  (Full term infant, approximately 1 HOL)    Feeding Assessment  Nutrition Source: Breastmilk  Feeding Method: Nursing at the breast  Unable to assess infant feeding at this time: Maternal request    Patient Follow-up  Inpatient Lactation Follow-up Needed : Yes    Other OB Lactation Documentation  Maternal Risk Factors: Age over 30, primiparity    Recommendations/Summary  34 year old  experienced breastfeeding mother and infant. Met with parents for initial lactation consult to assess breastfeeding goals, to address any questions and/or concerns and to offer lactation assistance, support and education as needed and desired. Reports breastfeeding her other children for 7 months with one of her children and 14 months with the rest. Verbalizes goal of breastfeeding this infant for as long as possible. Mother endorses positive breast changes during pregnancy and denies history of breast or nipple surgery.     Infant is currently being weighed by nursing staff. Mother reports that infant latched well for about five minutes on one breast and did not want to latch to opposite breast. Encouraged mother to put infant back on when testing is completed. Reviewed first 24 hour feeding patterns and behaviors. Mother reports  understanding. LC offered to stay to assist with the feeding but mother reports that she will call if she is struggling to get the infant to latch.     Breastfeeding handouts provided. Breastfeeding education and support provided throughout consult. Parents provided with the opportunity to ask questions. All questions answered. See education flow sheet for detailed list of education topics covered. Reviewed importance of frequent skin to skin contact, waking techniques, infant stomach capacity, value of colostrum feeds and typical  feeding behaviors in the first 24 hours. Encouraged frequent skin to skin and nursing with cues and at least 8 times in the first 24 hours. Reviewed signs of adequate intake/output. Parents deny any further questions or concerns at this time. Has a personal breast pump for home use. Offered ongoing breastfeeding assistance, support and education as needed and desired.

## 2024-09-11 VITALS
BODY MASS INDEX: 27.97 KG/M2 | SYSTOLIC BLOOD PRESSURE: 113 MMHG | DIASTOLIC BLOOD PRESSURE: 73 MMHG | WEIGHT: 152.01 LBS | HEART RATE: 68 BPM | HEIGHT: 62 IN | TEMPERATURE: 97.9 F | RESPIRATION RATE: 18 BRPM | OXYGEN SATURATION: 99 %

## 2024-09-11 PROCEDURE — 2500000004 HC RX 250 GENERAL PHARMACY W/ HCPCS (ALT 636 FOR OP/ED): Performed by: OBSTETRICS & GYNECOLOGY

## 2024-09-11 PROCEDURE — 2500000001 HC RX 250 WO HCPCS SELF ADMINISTERED DRUGS (ALT 637 FOR MEDICARE OP): Performed by: OBSTETRICS & GYNECOLOGY

## 2024-09-11 RX ORDER — SERTRALINE HYDROCHLORIDE 50 MG/1
50 TABLET, FILM COATED ORAL DAILY
Status: DISCONTINUED | OUTPATIENT
Start: 2024-09-11 | End: 2024-09-11 | Stop reason: HOSPADM

## 2024-09-11 ASSESSMENT — PAIN DESCRIPTION - LOCATION: LOCATION: ABDOMEN

## 2024-09-11 ASSESSMENT — PAIN SCALES - GENERAL
PAINLEVEL_OUTOF10: 0 - NO PAIN
PAINLEVEL_OUTOF10: 5 - MODERATE PAIN
PAINLEVEL_OUTOF10: 5 - MODERATE PAIN
PAINLEVEL_OUTOF10: 8

## 2024-09-11 NOTE — LACTATION NOTE
This note was copied from a baby's chart.  Lactation Consultant Note  Lactation Consultation  Reason for Consult: Other (Comment), Follow-up assessment (Discharge Education)  Consultant Name: CARLOS Barrios    Maternal Information  Has mother  before?: Yes  How long did the mother previously breastfeed?: 7 months with 1 child, 14 months with 5 children  Infant to breast within first 2 hours of birth?: Yes  Exclusive Pump and Bottle Feed: No    Maternal Assessment  Breast Assessment:  (Unable to assess at this time)  Nipple Assessment: Intact (per mother's report)    Infant Assessment  Infant Behavior: Deep sleep    Feeding Assessment  Nutrition Source: Breastmilk  Feeding Method: Nursing at the breast  Unable to assess infant feeding at this time: Maternal request    LATCH TOOL       Breast Pump       Other OB Lactation Tools       Patient Follow-up  Inpatient Lactation Follow-up Needed : No  Lactation Professional - OK to Discharge: Yes    Other OB Lactation Documentation       Recommendations/Summary  LC at bedside for lactation consultation and discharge education. Mother states that she just finished feeding infant and that she did well. Mother reports that infant is initially hard to rouse during the day, but active at night. Mother reports infant as cluster feeding overnight. Reviewed normal day of life ( night of life) 2 and 3 with mother and the normalcy of cluster feeding. Reviewed stomach capacity and offering the breast as much as possible. Mother states that infant has had a deep and comfortable latch and denies any pain or breakdown with latch.   Breastfeeding Step-By-Step handout given and reviewed. Mother given opportunity to ask any questions at this time. Mother denies having any questions or concerns and is confident with breastfeeding. Mother has follow up contact information for lactation should any issues arise.    Offered ongoing assistance with breastfeeding.

## 2024-09-11 NOTE — PROGRESS NOTES
Postpartum Progress Note    Assessment/Plan   Charlee Villegas is a 34 y.o., , who delivered at 39w6d gestation and is now postpartum day 1.  -doing well, bleeding stable  -plan home today, instructions and folow up reviewed    Assessment & Plan    Pregnancy Problems (from 24 to present)       Problem Noted Resolved    Term pregnancy ([facility] 9/10/2024 by Ruthie Joyce MD 9/10/2024 by Ruthie Joyce MD    Priority:  Medium            Hospital course: no complications  Vaginal Birth  Patient is currently breastfeedingThe patient's blood type is O NEG. The baby's blood type is A NEG. Rhogam is not indicated.    Subjective   Her pain is well controlled with current medications  She is passing flatus  She is ambulating well  She is tolerating a Adult diet Regular  She reports no breast or nursing problems  She denies emotional concerns today   Her plan for contraception is condoms    Objective   Allergies:   Azithromycin         Last Vitals:  Temp Pulse Resp BP MAP Pulse Ox   36.6 °C (97.9 °F) 63 16 107/68 82 98 %     Vitals Min/Max Last 24 Hours:  Temp  Min: 36.6 °C (97.9 °F)  Max: 36.8 °C (98.2 °F)  Pulse  Min: 63  Max: 93  Resp  Min: 14  Max: 18  BP  Min: 107/68  Max: 135/86  MAP (mmHg)  Min: 81  Max: 106    Intake/Output:     Intake/Output Summary (Last 24 hours) at 2024 0809  Last data filed at 9/10/2024 1840  Gross per 24 hour   Intake 1500 ml   Output 248 ml   Net 1252 ml       Physical Exam:  Physical Exam  Constitutional:       General: She is not in acute distress.     Appearance: Normal appearance.   Pulmonary:      Effort: Pulmonary effort is normal.   Abdominal:      General: Bowel sounds are normal.      Palpations: Abdomen is soft.      Uterus firm, below U  Musculoskeletal:         General: Normal range of motion.   Neurological:      Mental Status: She is alert.   Psychiatric:         Mood and Affect: Mood normal.       Lab Data:

## 2024-09-11 NOTE — L&D DELIVERY NOTE
OB Delivery Note  9/10/2024  Charlee Villegas  34 y.o.   Vaginal, Spontaneous       Gestational Age: 39w6d  /Para:   Quantitative Blood Loss: Admission to Discharge: 248 mL (9/10/2024  5:58 AM - 9/10/2024 11:03 PM)    Charlee Villegas Girl [[id number]]      Labor Events    Sac identifier: Sac 1  Rupture date/time: 9/10/2024 1249  Rupture type: Artificial  Fluid color: Clear  Fluid odor: None  Labor type: Induced Onset of Labor  Labor allowed to proceed with plans for an attempted vaginal birth?: Yes  Induction: Misoprostol  First cervical ripening date/time: 9/10/2024 0745  Induction date/time: 9/10/2024 0558  Induction indications: Risk Reducing  Complications: None       Labor Event Times    Labor onset date/time: 9/10/2024 124  Dilation complete date/time: 9/10/2024 1535  Start pushing date/time: 9/10/2024 1541       Labor Length    1st stage: 2h 55m  2nd stage: 0h 07m  3rd stage: 0h 02m       Placenta    Placenta delivery date/time: 9/10/2024 1545  Placenta removal: Spontaneous  Placenta appearance: Intact  Placenta disposition: discarded       Cord    Vessels: 3 vessels  Complications: None  Delayed cord clamping?: Yes  Cord clamped date/time: 9/10/2024 15:43:48  Cord blood disposition: Lab  Gases sent?: No  Stem cell collection (by provider): No       Lacerations    Episiotomy: None       Anesthesia    Method: None       Operative Delivery    Forceps attempted?: No  Vacuum extractor attempted?: No       Shoulder Dystocia    Shoulder dystocia present?: No       Rosser Delivery    Time head delivered: 9/10/2024 15:42:13  Birth date/time: 9/10/2024 15:42:42  Delivery type: Vaginal, Spontaneous  Complications: None       Resuscitation    Method: Suctioning, Tactile stimulation       Apgars    Living status: Living  Apgar Component Scores:  1 min.:  5 min.:  10 min.:  15 min.:  20 min.:    Skin color:  0  1       Heart rate:  2  2       Reflex irritability:  2  2       Muscle tone:  2  2        Respiratory effort:  2  2       Total:  8  9       Apgars assigned by: LORENA VARGAS RN       Delivery Providers    Delivering clinician: Ruthie Joyce MD   Provider Role    Mikki Monteiro, LAST Delivery Nurse    Mary Vargas, RN Nursery Nurse    Shadia Thomas, DO Resident                     Ruthie Joyce MD

## 2024-09-11 NOTE — CARE PLAN
The patient's goals for the shift include Establish breastfeeding, bond with baby, and rest    The clinical goals for the shift include Vitals and lochia WNL    Over the shift, the patient did make progress toward the following goals. Barriers to progression include n/a. Recommendations to address these barriers include n/a.      Problem: Pain - Adult  Goal: Verbalizes/displays adequate comfort level or baseline comfort level  Outcome: Progressing     Problem: Safety - Adult  Goal: Free from fall injury  Outcome: Progressing     Problem: Discharge Planning  Goal: Discharge to home or other facility with appropriate resources  Outcome: Progressing

## 2024-09-11 NOTE — DISCHARGE SUMMARY
Discharge Summary    Admission Date: 9/10/2024  Discharge Date: 9/11/2024    Discharge Diagnosis  Term pregnancy ([facility])    Hospital Course  Delivery Date: 9/10/2024 3:42 PM  Delivery type: Vaginal, Spontaneous   GA at delivery: 39w6d  Outcome: Living  Anesthesia during delivery: None  Intrapartum complications: None  Feeding method: Breastfeeding Status: Yes     Procedures: none  Contraception at discharge: condoms    Pertinent Physical Exam At Time of Discharge    Last Vitals:  Temp Pulse Resp BP MAP Pulse Ox   36.6 °C (97.9 °F) 80 14 110/63 81 97 %     Discharge Meds     Your medication list        CONTINUE taking these medications        Instructions Last Dose Given Next Dose Due   PRENATAL VITAMIN ORAL           sertraline 50 mg tablet  Commonly known as: Zoloft                   Complications Requiring Follow-Up  Routine pp care    Test Results Pending At Discharge  Pending Labs       No current pending labs.          Outpatient Follow-Up  No future appointments.      Ruthie Joyce MD

## 2024-09-11 NOTE — LACTATION NOTE
This note was copied from a baby's chart.  Lactation Consultant Note  Lactation Consultation  Reason for Consult: Follow-up assessment  Consultant Name: Miguel marcos    Maternal Information  Has mother  before?: Yes  Infant to breast within first 2 hours of birth?: Yes  Exclusive Pump and Bottle Feed: No    Maternal Assessment  Breast Assessment: Medium, Symmetrical, Soft, Warm, Breast changes observed in pregnancy  Nipple Assessment: Intact, Erect, Rounded after feeding  Alterations in Nipple Condition:  (mother denies pain or discomfort)  Areola Assessment: Normal    Infant Assessment  Infant Behavior: Awake, Fussy, Crying, Sucking    Feeding Assessment  Nutrition Source: Breastmilk  Feeding Method: Nursing at the breast  Feeding Position: Breast sandwich, Cradle, Cross - cradle, Skin to skin, Both sides, Mother needs assistance with latch/positioning  Suck/Feeding: Sustained  Latch Assessment: Minimal assistance is needed, Deep latch obtained, Latch achieved, Comfortable with no pain, Bursts of sucking, swallowing, and rest, Comfortable latch    LATCH TOOL  Latch: Repeated attempts, hold nipple in mouth, stimulate to suck  Audible Swallowing: A few with stimulation  Type of Nipple: Everted (After stimulation)  Comfort (Breast/Nipple): Soft/non-tender  Hold (Positioning): Minimal assist, teach one side, mother does other, staff holds  LATCH Score: 7    Patient Follow-up  Inpatient Lactation Follow-up Needed : Yes    Other OB Lactation Documentation  Maternal Risk Factors: Age over 30, primiparity    Recommendations/Summary  Mother requesting lactation assistance due to infant being fussy and refusing to latch. Reviewed deep latch techniques, cross cradle hold and breast shaping. Infant screaming around the breast at first. LC eventually had infant suck on gloved finger. After a lot of palate stimulation infant eventually began sucking. Attempted to latch infant again with little success. Encouraged mother  to hand express to entice infant. Milk easily expressed and infant achieved deep latch. Encouraged mother to continue compressing breast to encourage milk flow to help infant sustain latch. After about 10 minutes infant popped off and became fussy again. Attempts were made to latch infant to the same breast with no success. Encouraged mother to switch infant to opposite breast. Deep latch quickly achieved in cradle hold. Mother reports latch as comfortable. All questions and concerns answered and addressed. Offered ongoing breastfeeding support and education as needed and desired.